# Patient Record
Sex: FEMALE | URBAN - METROPOLITAN AREA
[De-identification: names, ages, dates, MRNs, and addresses within clinical notes are randomized per-mention and may not be internally consistent; named-entity substitution may affect disease eponyms.]

---

## 2022-04-30 ENCOUNTER — TELEPHONE ENCOUNTER (OUTPATIENT)
Dept: URBAN - METROPOLITAN AREA CLINIC 121 | Facility: CLINIC | Age: 77
End: 2022-04-30

## 2022-05-01 ENCOUNTER — TELEPHONE ENCOUNTER (OUTPATIENT)
Dept: URBAN - METROPOLITAN AREA CLINIC 121 | Facility: CLINIC | Age: 77
End: 2022-05-01

## 2022-05-01 RX ORDER — ESOMEPRAZOLE MAGNESIUM 40 MG
CAPSULE,DELAYED RELEASE (ENTERIC COATED) ORAL
Status: ACTIVE | COMMUNITY
Start: 2007-11-12

## 2022-05-01 RX ORDER — FLUOXETINE HCL 20 MG
CAPSULE ORAL
Status: ACTIVE | COMMUNITY
Start: 2007-11-12

## 2024-08-11 ENCOUNTER — P2P PATIENT RECORD (OUTPATIENT)
Age: 79
End: 2024-08-11

## 2024-08-23 ENCOUNTER — OFFICE VISIT (OUTPATIENT)
Dept: URBAN - METROPOLITAN AREA CLINIC 27 | Facility: CLINIC | Age: 79
End: 2024-08-23
Payer: MEDICARE

## 2024-08-23 ENCOUNTER — DASHBOARD ENCOUNTERS (OUTPATIENT)
Age: 79
End: 2024-08-23

## 2024-08-23 ENCOUNTER — LAB OUTSIDE AN ENCOUNTER (OUTPATIENT)
Dept: URBAN - METROPOLITAN AREA CLINIC 27 | Facility: CLINIC | Age: 79
End: 2024-08-23

## 2024-08-23 DIAGNOSIS — K52.89 (LYMPHOCYTIC) MICROSCOPIC COLITIS: ICD-10-CM

## 2024-08-23 DIAGNOSIS — E11.9 TYPE 2 DIABETES MELLITUS WITHOUT COMPLICATIONS: ICD-10-CM

## 2024-08-23 DIAGNOSIS — I10 ESSENTIAL HYPERTENSION: ICD-10-CM

## 2024-08-23 DIAGNOSIS — Z80.0 FAMILY HISTORY OF COLON CANCER: ICD-10-CM

## 2024-08-23 PROBLEM — 59621000: Status: ACTIVE | Noted: 2024-08-23

## 2024-08-23 PROBLEM — 313436004: Status: ACTIVE | Noted: 2024-08-23

## 2024-08-23 PROBLEM — 25374005: Status: ACTIVE | Noted: 2024-08-23

## 2024-08-23 PROBLEM — 710815001: Status: ACTIVE | Noted: 2024-08-23

## 2024-08-23 PROBLEM — 312824007: Status: ACTIVE | Noted: 2024-08-23

## 2024-08-23 PROCEDURE — 99204 OFFICE O/P NEW MOD 45 MIN: CPT | Performed by: INTERNAL MEDICINE

## 2024-08-23 RX ORDER — GABAPENTIN 100 MG/1
CAPSULE ORAL
Qty: 90 CAPSULE | Status: ACTIVE | COMMUNITY

## 2024-08-23 RX ORDER — INSULIN GLARGINE 100 [IU]/ML
INJECT 32 UNITS SUBCUTANEOUSLY ONCE DAILY AS DIRECTED INJECTION, SOLUTION SUBCUTANEOUS
Qty: 15 MILLILITER | Refills: 1 | Status: ACTIVE | COMMUNITY

## 2024-08-23 RX ORDER — ESOMEPRAZOLE MAGNESIUM 40 MG
CAPSULE,DELAYED RELEASE (ENTERIC COATED) ORAL
Status: DISCONTINUED | COMMUNITY
Start: 2007-11-12

## 2024-08-23 RX ORDER — FLUOXETINE HCL 20 MG
CAPSULE ORAL
Status: DISCONTINUED | COMMUNITY
Start: 2007-11-12

## 2024-08-23 NOTE — HPI-TODAY'S VISIT:
Mrs. Veliz is a 79-year-old -American female who presents today for follow-up from [facility]'s emergency room where she presented 1 week ago with acute onset of nausea, vomiting and diarrhea.  Her symptoms began acutely.  Today, she reports some modest improvement of her symptoms she just does not have an appetite.  She is no longer having diarrhea.  She reports having a colonoscopy more than 10 years ago and reports that her sister was diagnosed with colon cancer.  Otherwise, she has no other GI complaints.  Her medical history is significant for hypertension and diabetes mellitus.

## 2024-08-27 ENCOUNTER — OFFICE VISIT (OUTPATIENT)
Dept: URBAN - METROPOLITAN AREA SURGERY CENTER 7 | Facility: SURGERY CENTER | Age: 79
End: 2024-08-27

## 2024-08-27 RX ORDER — INSULIN GLARGINE 100 [IU]/ML
INJECT 32 UNITS SUBCUTANEOUSLY ONCE DAILY AS DIRECTED INJECTION, SOLUTION SUBCUTANEOUS
Qty: 15 MILLILITER | Refills: 1 | Status: ACTIVE | COMMUNITY

## 2024-08-27 RX ORDER — GABAPENTIN 100 MG/1
CAPSULE ORAL
Qty: 90 CAPSULE | Status: ACTIVE | COMMUNITY